# Patient Record
Sex: MALE | NOT HISPANIC OR LATINO | URBAN - METROPOLITAN AREA
[De-identification: names, ages, dates, MRNs, and addresses within clinical notes are randomized per-mention and may not be internally consistent; named-entity substitution may affect disease eponyms.]

---

## 2019-01-02 ENCOUNTER — IMPORTED ENCOUNTER (OUTPATIENT)
Dept: URBAN - METROPOLITAN AREA CLINIC 1 | Facility: CLINIC | Age: 19
End: 2019-01-02

## 2019-01-02 PROBLEM — H52.13: Noted: 2019-01-02

## 2019-01-02 PROCEDURE — S0620 ROUTINE OPHTHALMOLOGICAL EXA: HCPCS

## 2019-01-02 NOTE — PATIENT DISCUSSION
1. Myopia OU -- Finalized Glasses MRx was given to patient today for correction if indicated and requested2. H/o Refractive Amblyopia OS Trial CTL were given to patient today. Return for next available I&R appointment with Peter Tariq for an appointment in 1 [id number] for a CC OU with Dr. Taqueria Bonilla.

## 2019-01-10 ENCOUNTER — IMPORTED ENCOUNTER (OUTPATIENT)
Dept: URBAN - METROPOLITAN AREA CLINIC 1 | Facility: CLINIC | Age: 19
End: 2019-01-10

## 2019-01-10 NOTE — PATIENT DISCUSSION
1.  CC today - New trials given to help improve comfort. Return for an appointment in 1 week cc with Dr. Milagros Stone.

## 2019-02-22 ENCOUNTER — IMPORTED ENCOUNTER (OUTPATIENT)
Dept: URBAN - METROPOLITAN AREA CLINIC 1 | Facility: CLINIC | Age: 19
End: 2019-02-22

## 2022-04-02 ASSESSMENT — KERATOMETRY
OD_AXISANGLE2_DEGREES: 088
OS_K1POWER_DIOPTERS: 39.50
OD_K1POWER_DIOPTERS: 39.25
OD_K2POWER_DIOPTERS: 40.75
OS_AXISANGLE_DEGREES: 017
OD_AXISANGLE_DEGREES: 178
OS_K2POWER_DIOPTERS: 40.50
OS_AXISANGLE2_DEGREES: 107

## 2022-04-02 ASSESSMENT — VISUAL ACUITY
OS_SC: 20/20
OS_PH: SC 20/30
OD_CC: 20/40
OS_CC: 20/100
OD_SC: 20/20
OD_PH: SC 20/20

## 2022-04-02 ASSESSMENT — TONOMETRY
OD_IOP_MMHG: 11
OS_IOP_MMHG: 12